# Patient Record
Sex: MALE | Race: WHITE | NOT HISPANIC OR LATINO | URBAN - METROPOLITAN AREA
[De-identification: names, ages, dates, MRNs, and addresses within clinical notes are randomized per-mention and may not be internally consistent; named-entity substitution may affect disease eponyms.]

---

## 2017-05-23 ENCOUNTER — HOSPITAL ENCOUNTER (EMERGENCY)
Facility: HOSPITAL | Age: 22
Discharge: LEFT BEFORE DISCHARGE | End: 2017-05-23

## 2017-05-23 VITALS
SYSTOLIC BLOOD PRESSURE: 152 MMHG | OXYGEN SATURATION: 99 % | BODY MASS INDEX: 32.33 KG/M2 | DIASTOLIC BLOOD PRESSURE: 95 MMHG | TEMPERATURE: 98.4 F | HEART RATE: 82 BPM | HEIGHT: 75 IN | RESPIRATION RATE: 14 BRPM | WEIGHT: 260 LBS

## 2017-05-23 PROCEDURE — 99211 OFF/OP EST MAY X REQ PHY/QHP: CPT

## 2020-10-26 ENCOUNTER — HOSPITAL ENCOUNTER (EMERGENCY)
Facility: HOSPITAL | Age: 25
Discharge: HOME | End: 2020-10-26
Attending: EMERGENCY MEDICINE | Admitting: EMERGENCY MEDICINE

## 2020-10-26 ENCOUNTER — APPOINTMENT (OUTPATIENT)
Dept: CT IMAGING | Facility: HOSPITAL | Age: 25
End: 2020-10-26

## 2020-10-26 VITALS
BODY MASS INDEX: 38.54 KG/M2 | HEART RATE: 70 BPM | SYSTOLIC BLOOD PRESSURE: 127 MMHG | RESPIRATION RATE: 18 BRPM | HEIGHT: 75 IN | WEIGHT: 310 LBS | DIASTOLIC BLOOD PRESSURE: 83 MMHG | TEMPERATURE: 98.1 F | OXYGEN SATURATION: 98 %

## 2020-10-26 DIAGNOSIS — M79.18 MYOFASCIAL PAIN: ICD-10-CM

## 2020-10-26 DIAGNOSIS — S39.012A BACK STRAIN, INITIAL ENCOUNTER: Primary | ICD-10-CM

## 2020-10-26 LAB
HOLD SPECIMEN: NORMAL
HOLD SPECIMEN: NORMAL
WHOLE BLOOD HOLD SPECIMEN: NORMAL
WHOLE BLOOD HOLD SPECIMEN: NORMAL

## 2020-10-26 PROCEDURE — 72128 CT CHEST SPINE W/O DYE: CPT

## 2020-10-26 PROCEDURE — 96374 THER/PROPH/DIAG INJ IV PUSH: CPT

## 2020-10-26 PROCEDURE — 72125 CT NECK SPINE W/O DYE: CPT

## 2020-10-26 PROCEDURE — 99284 EMERGENCY DEPT VISIT MOD MDM: CPT

## 2020-10-26 PROCEDURE — 25010000002 MORPHINE PER 10 MG: Performed by: EMERGENCY MEDICINE

## 2020-10-26 PROCEDURE — 72131 CT LUMBAR SPINE W/O DYE: CPT

## 2020-10-26 PROCEDURE — 25010000002 ONDANSETRON PER 1 MG: Performed by: EMERGENCY MEDICINE

## 2020-10-26 PROCEDURE — 96375 TX/PRO/DX INJ NEW DRUG ADDON: CPT

## 2020-10-26 RX ORDER — ONDANSETRON 2 MG/ML
4 INJECTION INTRAMUSCULAR; INTRAVENOUS ONCE
Status: COMPLETED | OUTPATIENT
Start: 2020-10-26 | End: 2020-10-26

## 2020-10-26 RX ORDER — HYDROCODONE BITARTRATE AND ACETAMINOPHEN 7.5; 325 MG/1; MG/1
1 TABLET ORAL EVERY 6 HOURS PRN
Qty: 15 TABLET | Refills: 0 | Status: SHIPPED | OUTPATIENT
Start: 2020-10-26 | End: 2021-09-13

## 2020-10-26 RX ORDER — SODIUM CHLORIDE 0.9 % (FLUSH) 0.9 %
10 SYRINGE (ML) INJECTION AS NEEDED
Status: DISCONTINUED | OUTPATIENT
Start: 2020-10-26 | End: 2020-10-26 | Stop reason: HOSPADM

## 2020-10-26 RX ORDER — MORPHINE SULFATE 2 MG/ML
4 INJECTION, SOLUTION INTRAMUSCULAR; INTRAVENOUS ONCE
Status: COMPLETED | OUTPATIENT
Start: 2020-10-26 | End: 2020-10-26

## 2020-10-26 RX ORDER — BACLOFEN 10 MG/1
10 TABLET ORAL 3 TIMES DAILY
Qty: 30 TABLET | Refills: 0 | Status: SHIPPED | OUTPATIENT
Start: 2020-10-26

## 2020-10-26 RX ADMIN — SODIUM CHLORIDE, PRESERVATIVE FREE 10 ML: 5 INJECTION INTRAVENOUS at 18:26

## 2020-10-26 RX ADMIN — ONDANSETRON 4 MG: 2 INJECTION INTRAMUSCULAR; INTRAVENOUS at 18:26

## 2020-10-26 RX ADMIN — MORPHINE SULFATE 4 MG: 2 INJECTION, SOLUTION INTRAMUSCULAR; INTRAVENOUS at 18:27

## 2020-10-26 NOTE — ED PROVIDER NOTES
EMERGENCY DEPARTMENT ENCOUNTER    Room Number:  28/28  Date of encounter:  10/26/2020  PCP: Provider, No Known  Historian: Patient      HPI:  Chief Complaint:   A complete HPI/ROS/PMH/PSH/SH/FH are unobtainable due to: Back pain    Context: Sana Matt is a 25 y.o. male who presents to the ED c/o severe upper back and shoulder pain for 4 days since he fell at work.  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  He said that initially the pain was moderate in severity, but since then it is worsened.  The pain radiates around to her shoulders and around the chest on both sides.  He has no weakness or numbness, no radicular symptoms in the lower extremities, no belly pain, and no cauda equina symptoms.  Patient did not hit his head, has no headache and no neck pain.  He did not hit his head.    Patient states that he was seen in the ED in [facility] twice, had a CT scan of the chest which he reports was negative, but he is not aware of anything else that they did.  He came here tonight because he just feels that something else is wrong.      PAST MEDICAL HISTORY  Active Ambulatory Problems     Diagnosis Date Noted   • No Active Ambulatory Problems     Resolved Ambulatory Problems     Diagnosis Date Noted   • No Resolved Ambulatory Problems     Past Medical History:   Diagnosis Date   • Injury of back          PAST SURGICAL HISTORY  Past Surgical History:   Procedure Laterality Date   • ADENOIDECTOMY     • APPENDECTOMY     • TONSILLECTOMY           FAMILY HISTORY  History reviewed. No pertinent family history.      SOCIAL HISTORY  Social History     Socioeconomic History   • Marital status:      Spouse name: Not on file   • Number of children: Not on file   • Years of education: Not on file   • Highest education level: Not on file   Tobacco Use   • Smoking status: Never Smoker   • Smokeless tobacco: Never Used   Substance and  Sexual Activity   • Alcohol use: Yes     Comment: prn   • Drug use: No         ALLERGIES  Patient has no known allergies.        REVIEW OF SYSTEMS  Review of Systems     All systems reviewed and negative except for those discussed in HPI.       PHYSICAL EXAM    I have reviewed the triage vital signs and nursing notes.    ED Triage Vitals   Temp Heart Rate Resp BP SpO2   10/26/20 1509 10/26/20 1509 10/26/20 1509 10/26/20 1610 10/26/20 1509   98.1 °F (36.7 °C) 103 16 (!) 160/101 99 %      Temp src Heart Rate Source Patient Position BP Location FiO2 (%)   10/26/20 1509 10/26/20 1509 -- -- --   Tympanic Monitor          Physical Exam  GENERAL: Awake and alert, appears uncomfortable, obese  HENT: nares patent, NCAT, neck nontender, full range of motion without pain  EYES: no scleral icterus, Guerrero, EOMI  CV: regular rhythm, regular rate  RESPIRATORY: normal effort  ABDOMEN: soft, nontender palpation, bowel sounds present  MUSCULOSKELETAL: no deformity, no extremity trauma, normal strength sensation.  There is moderate focal tenderness palpation in the mid lumbar spine without step-off or deformity.  Distal pulses are symmetric and intact  NEURO: alert, moves all extremities, follows commands, cranial nerves II through XII intact, no focal neuro deficits with normal reflexes in all extremities.  SKIN: warm, dry        LAB RESULTS  Recent Results (from the past 24 hour(s))   Light Blue Top    Collection Time: 10/26/20  6:29 PM   Result Value Ref Range    Extra Tube hold for add-on    Green Top (Gel)    Collection Time: 10/26/20  6:29 PM   Result Value Ref Range    Extra Tube Hold for add-ons.    Lavender Top    Collection Time: 10/26/20  6:29 PM   Result Value Ref Range    Extra Tube hold for add-on    Gold Top - SST    Collection Time: 10/26/20  6:29 PM   Result Value Ref Range    Extra Tube Hold for add-ons.        Ordered the above labs and independently reviewed the results.        RADIOLOGY  Ct Cervical Spine Without  Contrast    Result Date: 10/26/2020  CT CERVICAL SPINE WITHOUT CONTRAST, CT THORACIC SPINE WITHOUT CONTRAST, CT LUMBAR SPINE WITHOUT CONTRAST  HISTORY: 25-year-old fell down stairs with generalized back pain and pain between the shoulder blades.  TECHNIQUE: CT cervical, thoracic and lumbar spine without contrast. Data reconstructed in coronal and sagittal planes.Radiation dose reduction techniques were utilized, including automated exposure control and exposure modulation based on body size.  FINDINGS: Cervical spine vertebral body and disc space heights appear within normal limits. There is no evidence for fracture or malalignment. There is streak artifact limiting evaluation for canal narrowing particularly in the mid-to-lower cervical spine. There is no evidence for osseous encroachment on the central canal and neural foramina. Evaluation for canal stenosis could be performed with MRI if indicated.  THORACIC SPINE: There is Schmorl's node formation within the superior endplates of T11 and T12 associated with mild chronic diminished height. Minor endplate spurring is present within the thoracic spine. No fracture or acute abnormality is evident of the thoracic spine. There is no evidence for osseous encroachment of the central canal and neural foramina. There is a mild scoliotic curvature of the thoracic spine convex to the right at T6.  There is a calcified nodule in the left upper lobe. Residual thymic tissue is present in the anterior mediastinum. Left hilar calcified granulomas are present.  LUMBAR SPINE: Vertebral body and disc space heights appear within normal limits. There is no evidence for fracture or acute abnormality. Minimal disc bulging is present without central canal or foraminal narrowing. There is mild levoscoliotic curvature of the lumbar spine.      No evidence for fracture or malalignment or acute abnormality of the cervical, thoracic or lumbar spine. Mild degenerative changes. At T11 and T12  there is Schmorl's node formation along the superior endplates with slight chronic-appearing diminished height. Mild thoracolumbar scoliosis.  Discussed with Dr. Palmer in the emergency department on 10/26/2020 at 7:40 PM.  This report was finalized on 10/26/2020 8:16 PM by Dr. Rashid Faustin M.D.      Ct Thoracic Spine Without Contrast    Result Date: 10/26/2020  CT CERVICAL SPINE WITHOUT CONTRAST, CT THORACIC SPINE WITHOUT CONTRAST, CT LUMBAR SPINE WITHOUT CONTRAST  HISTORY: 25-year-old fell down stairs with generalized back pain and pain between the shoulder blades.  TECHNIQUE: CT cervical, thoracic and lumbar spine without contrast. Data reconstructed in coronal and sagittal planes.Radiation dose reduction techniques were utilized, including automated exposure control and exposure modulation based on body size.  FINDINGS: Cervical spine vertebral body and disc space heights appear within normal limits. There is no evidence for fracture or malalignment. There is streak artifact limiting evaluation for canal narrowing particularly in the mid-to-lower cervical spine. There is no evidence for osseous encroachment on the central canal and neural foramina. Evaluation for canal stenosis could be performed with MRI if indicated.  THORACIC SPINE: There is Schmorl's node formation within the superior endplates of T11 and T12 associated with mild chronic diminished height. Minor endplate spurring is present within the thoracic spine. No fracture or acute abnormality is evident of the thoracic spine. There is no evidence for osseous encroachment of the central canal and neural foramina. There is a mild scoliotic curvature of the thoracic spine convex to the right at T6.  There is a calcified nodule in the left upper lobe. Residual thymic tissue is present in the anterior mediastinum. Left hilar calcified granulomas are present.  LUMBAR SPINE: Vertebral body and disc space heights appear within normal limits. There is no  evidence for fracture or acute abnormality. Minimal disc bulging is present without central canal or foraminal narrowing. There is mild levoscoliotic curvature of the lumbar spine.      No evidence for fracture or malalignment or acute abnormality of the cervical, thoracic or lumbar spine. Mild degenerative changes. At T11 and T12 there is Schmorl's node formation along the superior endplates with slight chronic-appearing diminished height. Mild thoracolumbar scoliosis.  Discussed with Dr. Palmer in the emergency department on 10/26/2020 at 7:40 PM.  This report was finalized on 10/26/2020 8:16 PM by Dr. Rashid Faustin M.D.      Ct Lumbar Spine Without Contrast    Result Date: 10/26/2020  CT CERVICAL SPINE WITHOUT CONTRAST, CT THORACIC SPINE WITHOUT CONTRAST, CT LUMBAR SPINE WITHOUT CONTRAST  HISTORY: 25-year-old fell down stairs with generalized back pain and pain between the shoulder blades.  TECHNIQUE: CT cervical, thoracic and lumbar spine without contrast. Data reconstructed in coronal and sagittal planes.Radiation dose reduction techniques were utilized, including automated exposure control and exposure modulation based on body size.  FINDINGS: Cervical spine vertebral body and disc space heights appear within normal limits. There is no evidence for fracture or malalignment. There is streak artifact limiting evaluation for canal narrowing particularly in the mid-to-lower cervical spine. There is no evidence for osseous encroachment on the central canal and neural foramina. Evaluation for canal stenosis could be performed with MRI if indicated.  THORACIC SPINE: There is Schmorl's node formation within the superior endplates of T11 and T12 associated with mild chronic diminished height. Minor endplate spurring is present within the thoracic spine. No fracture or acute abnormality is evident of the thoracic spine. There is no evidence for osseous encroachment of the central canal and neural foramina. There is a  mild scoliotic curvature of the thoracic spine convex to the right at T6.  There is a calcified nodule in the left upper lobe. Residual thymic tissue is present in the anterior mediastinum. Left hilar calcified granulomas are present.  LUMBAR SPINE: Vertebral body and disc space heights appear within normal limits. There is no evidence for fracture or acute abnormality. Minimal disc bulging is present without central canal or foraminal narrowing. There is mild levoscoliotic curvature of the lumbar spine.      No evidence for fracture or malalignment or acute abnormality of the cervical, thoracic or lumbar spine. Mild degenerative changes. At T11 and T12 there is Schmorl's node formation along the superior endplates with slight chronic-appearing diminished height. Mild thoracolumbar scoliosis.  Discussed with Dr. Palmer in the emergency department on 10/26/2020 at 7:40 PM.  This report was finalized on 10/26/2020 8:16 PM by Dr. Rashid Faustin M.D.        I ordered the above noted radiological studies. Reviewed by me and discussed with radiologist.  See dictation for official radiology interpretation.      PROCEDURES    Procedures      MEDICATIONS GIVEN IN ER    Medications   sodium chloride 0.9 % flush 10 mL (10 mL Intravenous Given 10/26/20 1826)   morphine injection 4 mg (4 mg Intravenous Given 10/26/20 1827)   ondansetron (ZOFRAN) injection 4 mg (4 mg Intravenous Given 10/26/20 1826)         PROGRESS, DATA ANALYSIS, CONSULTS, AND MEDICAL DECISION MAKING    All labs have been independently reviewed by me.  All radiology studies have been reviewed by me and discussed with radiologist dictating the report.   EKG's independently viewed and interpreted by me.  Discussion below represents my analysis of pertinent findings related to patient's condition, differential diagnosis, treatment plan and final disposition.        ED Course as of Oct 26 2032   Mon Oct 26, 2020   2031 CT scan of the cervical, thoracic, and lumbar  spine shows no acute fracture or subluxation.  I believe this pain is all myofascial in nature, but he does appear uncomfortable I will give him a short course of stronger pain medication.    [DP]      ED Course User Index  [DP] Domo Palmer MD           PPE: Both the patient and I wore a surgical mask throughout the entire patient encounter. I wore protective goggles.     AS OF 20:32 EDT VITALS:    BP - 127/83  HR - 70  TEMP - 98.1 °F (36.7 °C) (Tympanic)  O2 SATS - 98%        DIAGNOSIS  Final diagnoses:   Back strain, initial encounter   Myofascial pain         DISPOSITION  Discharge           Domo Palmer MD  10/26/20 2032

## 2020-10-26 NOTE — ED NOTES
I am wearing mask, goggles, and gloves. When designated too; I am also wearing an apron and a face shield. The patient is wearing a surgical mask.      Dakota Acevedo RN  10/26/20 1478

## 2020-10-26 NOTE — ED NOTES
Patient was placed in face mask in first look.  Patient was wearing a face mask throughout our encounter.  I wore protective eye protection throughout the encounter.  Hand hygiene was performed before and after patient encounter.          Patient fell down 3 stairs on Friday and was seen twice at [facility] for same, comes to ER today with continued back and chest discomfort from mechanical fall 8 out of 10.     Aakash Giles RN  10/26/20 0246

## 2024-02-01 ENCOUNTER — OFFICE VISIT (OUTPATIENT)
Dept: FAMILY MEDICINE CLINIC | Facility: CLINIC | Age: 29
End: 2024-02-01
Payer: COMMERCIAL

## 2024-02-01 VITALS
HEART RATE: 100 BPM | BODY MASS INDEX: 39.17 KG/M2 | WEIGHT: 315 LBS | SYSTOLIC BLOOD PRESSURE: 142 MMHG | OXYGEN SATURATION: 99 % | HEIGHT: 75 IN | RESPIRATION RATE: 19 BRPM | DIASTOLIC BLOOD PRESSURE: 92 MMHG

## 2024-02-01 DIAGNOSIS — Z11.59 ENCOUNTER FOR HEPATITIS C SCREENING TEST FOR LOW RISK PATIENT: ICD-10-CM

## 2024-02-01 DIAGNOSIS — Z13.1 SCREENING FOR DIABETES MELLITUS: ICD-10-CM

## 2024-02-01 DIAGNOSIS — Z13.220 ENCOUNTER FOR LIPID SCREENING FOR CARDIOVASCULAR DISEASE: ICD-10-CM

## 2024-02-01 DIAGNOSIS — Z00.00 ROUTINE HEALTH MAINTENANCE: Primary | ICD-10-CM

## 2024-02-01 DIAGNOSIS — E34.9 HORMONE IMBALANCE: ICD-10-CM

## 2024-02-01 DIAGNOSIS — Z13.6 ENCOUNTER FOR LIPID SCREENING FOR CARDIOVASCULAR DISEASE: ICD-10-CM

## 2024-02-01 DIAGNOSIS — F64.0 GENDER DYSPHORIA IN ADULT: ICD-10-CM

## 2024-02-01 LAB
ALBUMIN SERPL-MCNC: 4.6 G/DL (ref 3.5–5.2)
ALBUMIN/GLOB SERPL: 1.6 G/DL
ALP SERPL-CCNC: 92 U/L (ref 39–117)
ALT SERPL-CCNC: 85 U/L (ref 1–41)
AST SERPL-CCNC: 58 U/L (ref 1–40)
BILIRUB SERPL-MCNC: 0.3 MG/DL (ref 0–1.2)
BUN SERPL-MCNC: 14 MG/DL (ref 6–20)
BUN/CREAT SERPL: 15.1 (ref 7–25)
CALCIUM SERPL-MCNC: 9.6 MG/DL (ref 8.6–10.5)
CHLORIDE SERPL-SCNC: 102 MMOL/L (ref 98–107)
CHOLEST SERPL-MCNC: 219 MG/DL (ref 0–200)
CO2 SERPL-SCNC: 24.2 MMOL/L (ref 22–29)
CREAT SERPL-MCNC: 0.93 MG/DL (ref 0.76–1.27)
EGFRCR SERPLBLD CKD-EPI 2021: 114.7 ML/MIN/1.73
GLOBULIN SER CALC-MCNC: 2.9 GM/DL
GLUCOSE SERPL-MCNC: 113 MG/DL (ref 65–99)
HDLC SERPL-MCNC: 34 MG/DL (ref 40–60)
LDLC SERPL CALC-MCNC: 148 MG/DL (ref 0–100)
POTASSIUM SERPL-SCNC: 4.5 MMOL/L (ref 3.5–5.2)
PROT SERPL-MCNC: 7.5 G/DL (ref 6–8.5)
SODIUM SERPL-SCNC: 136 MMOL/L (ref 136–145)
TRIGL SERPL-MCNC: 201 MG/DL (ref 0–150)
VLDLC SERPL CALC-MCNC: 37 MG/DL (ref 5–40)

## 2024-02-01 RX ORDER — ESTRADIOL VALERATE 20 MG/ML
INJECTION INTRAMUSCULAR
Qty: 5 ML | Refills: 3 | Status: SHIPPED | OUTPATIENT
Start: 2024-02-01

## 2024-02-01 NOTE — PROGRESS NOTES
"Chief Complaint  Establish Care, Gender Dysphoria in Adult, and Annual Exam    Subjective    History of Present Illness    Sana aMtt presents to [facility] for Establish Care, Gender Dysphoria in Adult, and Annual Exam.  History of Present Illness     Here today as above. Has not seen a physician in a while now, is overdue for general health exam.    [place] different from an early age, recalls looking in the mirror and feeling \"very off\" from the age of 9 or 10. Was able to more fully explore gender identity during her teens as she became aware of others who felt the same. Didn't come out socially until a year and a half ago. Got  to a cis-woman early and was afraid to compromise that. Wife has been extremely accepting since the social transition and is excited to have Sana start hormones. Is now much more comfortable in her identity as a transwoman and is excited to start hormones to see her body change.     Has done quite a bit of research into what's involved in gender-affirming hormone therapy. Doesn't have any real concerns about transition meds. No worries about her ability to self-administer.     Due for some routine labs today. Believes that she is caught up on vaccinations overall. Will get records for our review.    Working on diet and exercise. Weight is generally stable.    Has good family and social support. 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    Objective     Vital Signs:   /92   Pulse 100   Resp 19   Ht 190.5 cm (75\")   Wt (!) 155 kg (342 lb)   SpO2 99%   BMI 42.75 kg/m²   Physical Exam  Vitals and nursing note reviewed.   Constitutional:       General: She is not in acute distress.     Appearance: Normal appearance. She is not ill-appearing.   Cardiovascular:      Rate and Rhythm: Normal rate and regular rhythm.      Pulses: Normal pulses.      Heart sounds: Normal heart sounds. No murmur heard.  Pulmonary:      Effort: Pulmonary effort is " normal. No respiratory distress.      Breath sounds: Normal breath sounds. No rales.   Neurological:      Mental Status: She is alert and oriented to person, place, and time. Mental status is at baseline.   Psychiatric:         Mood and Affect: Mood normal.         Behavior: Behavior normal.           Assessment and Plan   Diagnoses and all orders for this visit:    1. Routine health maintenance (Primary)    2. Gender dysphoria in adult  -     estradiol valerate (DELESTROGEN) 20 MG/ML injection; Inject 0.15 ml (3 mg) subcutaneously twice weekly.  Dispense: 5 mL; Refill: 3    3. Hormone imbalance  -     estradiol valerate (DELESTROGEN) 20 MG/ML injection; Inject 0.15 ml (3 mg) subcutaneously twice weekly.  Dispense: 5 mL; Refill: 3    4. Screening for diabetes mellitus  -     Comprehensive Metabolic Panel    5. Encounter for lipid screening for cardiovascular disease  -     Lipid Panel    6. Encounter for hepatitis C screening test for low risk patient  -     Hepatitis C Antibody    Established gender dysphoria, hormonal imbalance in a transgender patient with a strong desire for gender affirming hormone therapy.     Gender identity is consistent, persistent, and insistent. Discussed risks, benefits, alternatives, potential side effects of therapy, and typical follow up. Resources provided including [facility] Standards of Care, PFLAG “Our Trans Loved Ones”, and local support groups.     Meds as above. Gave detailed instructions as to proper subcutaneous injection technique and demonstrated the same. Given sample injection supplies to last until they are able to obtain their own supply. Encouraged to sign up for and use [facility].    Class 3 Severe Obesity (BMI >=40). Obesity-related health conditions include the following: none. Obesity is unchanged. BMI is is above average; BMI management plan is completed. We discussed portion control and increasing exercise.    Encouraged to continue working on healthy lifestyle habits.  Recommended follow up as below. Encouraged communication via MyChart in the meantime.     Patient was given instructions and counseling regarding her condition or for health maintenance advice. Please see specific information pulled into the AVS (placed there by myself) if appropriate.    Return in about 3 months (around 5/1/2024), or if symptoms worsen or fail to improve, for f/u gender-affirming hormone therapy.    TIM Bruce MD    Prevention counseling performed as below: Mindfulness for stress management.

## 2024-02-01 NOTE — PATIENT INSTRUCTIONS
"Transfemme Resources    [facility] Transgender Support Group - Facebook group and Discord     [employer] for Transgender Health, Standards of Care  https://www.wpath.org/publications/soc - pages 38 and 40 especially     [facility], Transgender Health - [email or web address]      [facility] - [email or web address]    [facility] - [email or web address]     Parents, Families, and Friends of [place] and [place] - [email or web address]  - good document for friends/family who need to learn the basics     [facility] [place] - https://[email or web address]/documents  - helps with name change, gender marker change, etc, is state specific     * * * * * *    Insurance will typically cover hormones, but if they don't, or if you need to pay out of pocket, use the following site.    [employer]     Can get injection supplies online - [facility] supply [employer]. Recommended supplies as below:     1 ml, Luer-Marry Syringes      18G 1.5” or 20G 1\" hypodermic needles for drawing up      27G 1/2\" hypodermic needles for injecting      [facility] Trans Health Injection Guide -   [email or web address]-content/uploads/[email or web address]_InjectionGuide.[email or web address]     Injection supplies:  Syringes   Needles   Alcohol swabs  Cotton balls/band-aids  Sharps container     Mindfulness apps: Headspace, Smiling Mind, Calvary!    Mindfulness-Based Stress Reduction  Mindfulness-based stress reduction (MBSR) is a program that helps people learn to practice mindfulness. Mindfulness is the practice of intentionally paying attention to the present moment. It can be learned and practiced through techniques such as education, breathing exercises, meditation, and yoga. MBSR includes several mindfulness techniques in one program.  MBSR works best when you understand the " treatment, are willing to try new things, and can commit to spending time practicing what you learn. MBSR training may include learning about:  How your emotions, thoughts, and reactions affect your body.  New ways to respond to things that cause negative thoughts to start (triggers).  How to notice your thoughts and let go of them.  Practicing awareness of everyday things that you normally do without thinking.  The techniques and goals of different types of meditation.  What are the benefits of MBSR?  MBSR can have many benefits, which include helping you to:  Develop self-awareness. This refers to knowing and understanding yourself.  Learn skills and attitudes that help you to participate in your own health care.  Learn new ways to care for yourself.  Be more accepting about how things are, and let things go.  Be less judgmental and approach things with an open mind.  Be patient with yourself and trust yourself more.  MBSR has also been shown to:  Reduce negative emotions, such as depression and anxiety.  Improve memory and focus.  Change how you sense and approach pain.  Boost your body's ability to fight infections.  Help you connect better with other people.  Improve your sense of well-being.  Follow these instructions at home:    Find a local in-person or online MBSR program.  Set aside some time regularly for mindfulness practice.  Find a mindfulness practice that works best for you. This may include one or more of the following:  Meditation. Meditation involves focusing your mind on a certain thought or activity.  Breathing awareness exercises. These help you to stay present by focusing on your breath.  Body scan. For this practice, you lie down and pay attention to each part of your body from head to toe. You can identify tension and soreness and intentionally relax parts of your body.  Yoga. Yoga involves stretching and breathing, and it can improve your ability to move and be flexible. It can also provide  an experience of testing your body's limits, which can help you release stress.  Mindful eating. This way of eating involves focusing on the taste, texture, color, and smell of each bite of food. Because this slows down eating and helps you feel full sooner, it can be an important part of a weight-loss plan.  Find a podcast or recording that provides guidance for breathing awareness, body scan, or meditation exercises. You can listen to these any time when you have a free moment to rest without distractions.  Follow your treatment plan as told by your health care provider. This may include taking regular medicines and making changes to your diet or lifestyle as recommended.  How to practice mindfulness  To do a basic awareness exercise:  Find a comfortable place to sit.  Pay attention to the present moment. Observe your thoughts, feelings, and surroundings just as they are.  Avoid placing judgment on yourself, your feelings, or your surroundings. Make note of any judgment that comes up, and let it go.  Your mind may wander, and that is okay. Make note of when your thoughts drift, and return your attention to the present moment.  To do basic mindfulness meditation:  Find a comfortable place to sit. This may include a stable chair or a firm floor cushion.  Sit upright with your back straight. Let your arms fall next to your side with your hands resting on your legs.  If sitting in a chair, rest your feet flat on the floor.  If sitting on a cushion, cross your legs in front of you.  Keep your head in a neutral position with your chin dropped slightly. Relax your jaw and rest the tip of your tongue on the roof of your mouth. Drop your gaze to the floor. You can close your eyes if you like.  Breathe normally and pay attention to your breath. Feel the air moving in and out of your nose. Feel your belly expanding and relaxing with each breath.  Your mind may wander, and that is okay. Make note of when your thoughts drift,  and return your attention to your breath.  Avoid placing judgment on yourself, your feelings, or your surroundings. Make note of any judgment or feelings that come up, let them go, and bring your attention back to your breath.  When you are ready, lift your gaze or open your eyes. Pay attention to how your body feels after the meditation.  Where to find more information  You can find more information about MBSR from:  Your health care provider.  Community-based meditation centers or programs.  Programs offered near you.  Summary  Mindfulness-based stress reduction (MBSR) is a program that teaches you how to intentionally pay attention to the present moment. It is used with other treatments to help you cope better with daily stress, emotions, and pain.  MBSR focuses on developing self-awareness, which allows you to respond to life stress without judgment or negative emotions.  MBSR programs may involve learning different mindfulness practices, such as breathing exercises, meditation, yoga, body scan, or mindful eating. Find a mindfulness practice that works best for you, and set aside time for it on a regular basis.  This information is not intended to replace advice given to you by your health care provider. Make sure you discuss any questions you have with your health care provider.  Document Released: 04/26/2018 Document Revised: 11/30/2018 Document Reviewed: 04/26/2018  Elsevier Patient Education © 2020 [employer]vier Inc.

## 2024-02-02 LAB — HCV IGG SERPL QL IA: NON REACTIVE

## 2024-05-06 ENCOUNTER — OFFICE VISIT (OUTPATIENT)
Dept: FAMILY MEDICINE CLINIC | Facility: CLINIC | Age: 29
End: 2024-05-06
Payer: COMMERCIAL

## 2024-05-06 VITALS
OXYGEN SATURATION: 99 % | RESPIRATION RATE: 17 BRPM | BODY MASS INDEX: 39.17 KG/M2 | DIASTOLIC BLOOD PRESSURE: 80 MMHG | WEIGHT: 315 LBS | HEART RATE: 89 BPM | HEIGHT: 75 IN | SYSTOLIC BLOOD PRESSURE: 116 MMHG

## 2024-05-06 DIAGNOSIS — E34.9 HORMONE IMBALANCE: ICD-10-CM

## 2024-05-06 DIAGNOSIS — Z51.81 THERAPEUTIC DRUG MONITORING: ICD-10-CM

## 2024-05-06 DIAGNOSIS — F33.1 MAJOR DEPRESSIVE DISORDER, RECURRENT EPISODE, MODERATE DEGREE: ICD-10-CM

## 2024-05-06 DIAGNOSIS — F64.0 GENDER DYSPHORIA IN ADULT: Primary | ICD-10-CM

## 2024-05-06 DIAGNOSIS — F41.1 GENERALIZED ANXIETY DISORDER: ICD-10-CM

## 2024-05-06 DIAGNOSIS — L73.9 FOLLICULITIS: ICD-10-CM

## 2024-05-06 LAB
ALBUMIN SERPL-MCNC: 4.4 G/DL (ref 3.5–5.2)
ALBUMIN/GLOB SERPL: 1.9 G/DL
ALP SERPL-CCNC: 99 U/L (ref 39–117)
ALT SERPL-CCNC: 64 U/L (ref 1–41)
AST SERPL-CCNC: 40 U/L (ref 1–40)
BILIRUB SERPL-MCNC: 0.2 MG/DL (ref 0–1.2)
BUN SERPL-MCNC: 12 MG/DL (ref 6–20)
BUN/CREAT SERPL: 15.8 (ref 7–25)
CALCIUM SERPL-MCNC: 8.8 MG/DL (ref 8.6–10.5)
CHLORIDE SERPL-SCNC: 107 MMOL/L (ref 98–107)
CHOLEST SERPL-MCNC: 188 MG/DL (ref 0–200)
CO2 SERPL-SCNC: 22 MMOL/L (ref 22–29)
CREAT SERPL-MCNC: 0.76 MG/DL (ref 0.76–1.27)
EGFRCR SERPLBLD CKD-EPI 2021: 125.6 ML/MIN/1.73
GLOBULIN SER CALC-MCNC: 2.3 GM/DL
GLUCOSE SERPL-MCNC: 108 MG/DL (ref 65–99)
HDLC SERPL-MCNC: 42 MG/DL (ref 40–60)
LDLC SERPL CALC-MCNC: 124 MG/DL (ref 0–100)
POTASSIUM SERPL-SCNC: 4.5 MMOL/L (ref 3.5–5.2)
PROT SERPL-MCNC: 6.7 G/DL (ref 6–8.5)
SODIUM SERPL-SCNC: 137 MMOL/L (ref 136–145)
TRIGL SERPL-MCNC: 119 MG/DL (ref 0–150)
VLDLC SERPL CALC-MCNC: 22 MG/DL (ref 5–40)

## 2024-05-06 PROCEDURE — 99214 OFFICE O/P EST MOD 30 MIN: CPT | Performed by: FAMILY MEDICINE

## 2024-05-06 RX ORDER — ALBUTEROL SULFATE 90 UG/1
2 AEROSOL, METERED RESPIRATORY (INHALATION) EVERY 4 HOURS PRN
COMMUNITY

## 2024-05-06 RX ORDER — ESCITALOPRAM OXALATE 10 MG/1
10 TABLET ORAL DAILY
Qty: 90 TABLET | Refills: 1 | Status: SHIPPED | OUTPATIENT
Start: 2024-05-06

## 2024-05-06 RX ORDER — CLINDAMYCIN PHOSPHATE 10 MG/G
1 GEL TOPICAL 2 TIMES DAILY
Qty: 30 G | Refills: 1 | Status: SHIPPED | OUTPATIENT
Start: 2024-05-06

## 2024-05-07 LAB
ESTRADIOL SERPL-MCNC: 129 PG/ML (ref 7.6–42.6)
ESTRONE SERPL-MCNC: 89 PG/ML (ref 0–174)
PROGEST SERPL-MCNC: <0.1 NG/ML (ref 0–0.5)
TESTOST SERPL-MCNC: 21 NG/DL (ref 264–916)

## 2024-06-17 DIAGNOSIS — E34.9 HORMONE IMBALANCE: ICD-10-CM

## 2024-06-17 DIAGNOSIS — F64.0 GENDER DYSPHORIA IN ADULT: ICD-10-CM

## 2024-06-18 RX ORDER — ESTRADIOL VALERATE 20 MG/ML
INJECTION INTRAMUSCULAR
Qty: 5 ML | Refills: 3 | Status: SHIPPED | OUTPATIENT
Start: 2024-06-18

## 2024-08-26 ENCOUNTER — OFFICE VISIT (OUTPATIENT)
Dept: FAMILY MEDICINE CLINIC | Facility: CLINIC | Age: 29
End: 2024-08-26
Payer: COMMERCIAL

## 2024-08-26 VITALS
HEART RATE: 86 BPM | WEIGHT: 315 LBS | RESPIRATION RATE: 16 BRPM | SYSTOLIC BLOOD PRESSURE: 144 MMHG | DIASTOLIC BLOOD PRESSURE: 96 MMHG | OXYGEN SATURATION: 98 % | HEIGHT: 75 IN | BODY MASS INDEX: 39.17 KG/M2

## 2024-08-26 DIAGNOSIS — F41.1 GENERALIZED ANXIETY DISORDER: ICD-10-CM

## 2024-08-26 DIAGNOSIS — F64.0 GENDER DYSPHORIA IN ADULT: Primary | ICD-10-CM

## 2024-08-26 DIAGNOSIS — E34.9 HORMONE IMBALANCE: ICD-10-CM

## 2024-08-26 DIAGNOSIS — Z51.81 THERAPEUTIC DRUG MONITORING: ICD-10-CM

## 2024-08-26 DIAGNOSIS — F33.1 MAJOR DEPRESSIVE DISORDER, RECURRENT EPISODE, MODERATE DEGREE: ICD-10-CM

## 2024-08-26 PROCEDURE — 99214 OFFICE O/P EST MOD 30 MIN: CPT | Performed by: FAMILY MEDICINE

## 2024-08-26 RX ORDER — ESCITALOPRAM OXALATE 20 MG/1
20 TABLET ORAL DAILY
Qty: 90 TABLET | Refills: 3 | Status: SHIPPED | OUTPATIENT
Start: 2024-08-26

## 2024-08-26 NOTE — PROGRESS NOTES
"Chief Complaint  Gender dysphoria in adult (Interested in progesterone), Depression (Interested in increase in lexapro), and Anxiety    Subjective    History of Present Illness    Sana Matt presents to [facility] for Gender dysphoria in adult (Interested in progesterone), Depression (Interested in increase in lexapro), and Anxiety.  History of Present Illness     Here today for follow-up as above. Continues on estradiol injections as prescribed. Reports good adherence and tolerance, no unwanted side effects. No problems with self administration, no injection site reactions. Very happy with the current state of her transition. Due for check of hormone levels.    Stress has been quite high as she is currently undergoing a divorce. Seems to be managing it fairly well though is thinking that an increased dose of escitalopram would be useful. Had titrated from 5 mg up to 10 mg. Feels that this is an improvement but not quite controlling symptoms as much as needed.    Objective     Vital Signs:   /96   Pulse 86   Resp 16   Ht 190.5 cm (75\")   Wt (!) 158 kg (348 lb 14.4 oz)   SpO2 98%   BMI 43.61 kg/m²   Physical Exam  Vitals and nursing note reviewed.   Constitutional:       General: She is not in acute distress.     Appearance: Normal appearance. She is not ill-appearing.   Cardiovascular:      Rate and Rhythm: Normal rate and regular rhythm.      Pulses: Normal pulses.      Heart sounds: Normal heart sounds. No murmur heard.  Pulmonary:      Effort: Pulmonary effort is normal. No respiratory distress.      Breath sounds: Normal breath sounds. No rales.   Neurological:      Mental Status: She is alert and oriented to person, place, and time. Mental status is at baseline.   Psychiatric:         Mood and Affect: Mood normal.         Behavior: Behavior normal.                 Assessment and Plan   Diagnoses and all orders for this visit:    1. Gender dysphoria in adult " (Primary)  -     Estradiol  -     Estrone  -     Testosterone  -     Comprehensive Metabolic Panel  -     Progesterone  -     Lipid Panel    2. Hormone imbalance  -     Estradiol  -     Estrone  -     Testosterone  -     Comprehensive Metabolic Panel  -     Progesterone  -     Lipid Panel    3. Therapeutic drug monitoring  -     Estradiol  -     Estrone  -     Testosterone  -     Comprehensive Metabolic Panel  -     Progesterone  -     Lipid Panel    4. Generalized anxiety disorder  -     escitalopram (LEXAPRO) 20 MG tablet; Take 1 tablet by mouth Daily.  Dispense: 90 tablet; Refill: 3    5. Major depressive disorder, recurrent episode, moderate degree  -     escitalopram (LEXAPRO) 20 MG tablet; Take 1 tablet by mouth Daily.  Dispense: 90 tablet; Refill: 3    Orders as above. I will contact her with results and we can discuss any adjustments to her regimen as needed.    Will increase escitalopram to 20 mg. She will let me know how this works for her and we can add additional agents as needed. Encouraged her to see a therapist.    Recommended follow up as below. Encouraged communication via [employer] in the meantime.     Patient was given instructions and counseling regarding her condition or for health maintenance advice. Please see specific information pulled into the AVS (placed there by myself) if appropriate.    Return in about 3 months (around 11/26/2024), or if symptoms worsen or fail to improve, for f/u gender-affirming hormone therapy.    TIM Bruce MD

## 2024-08-27 LAB
ALBUMIN SERPL-MCNC: 4.2 G/DL (ref 4.3–5.2)
ALP SERPL-CCNC: 94 IU/L (ref 44–121)
ALT SERPL-CCNC: 58 IU/L (ref 0–44)
AST SERPL-CCNC: 29 IU/L (ref 0–40)
BILIRUB SERPL-MCNC: 0.4 MG/DL (ref 0–1.2)
BUN SERPL-MCNC: 13 MG/DL (ref 6–20)
BUN/CREAT SERPL: 19 (ref 9–20)
CALCIUM SERPL-MCNC: 9.1 MG/DL (ref 8.7–10.2)
CHLORIDE SERPL-SCNC: 100 MMOL/L (ref 96–106)
CHOLEST SERPL-MCNC: 201 MG/DL (ref 100–199)
CO2 SERPL-SCNC: 21 MMOL/L (ref 20–29)
CREAT SERPL-MCNC: 0.68 MG/DL (ref 0.76–1.27)
EGFRCR SERPLBLD CKD-EPI 2021: 130 ML/MIN/1.73
ESTRADIOL SERPL-MCNC: 229 PG/ML (ref 7.6–42.6)
GLOBULIN SER CALC-MCNC: 2.9 G/DL (ref 1.5–4.5)
GLUCOSE SERPL-MCNC: 96 MG/DL (ref 70–99)
HDLC SERPL-MCNC: 43 MG/DL
LDLC SERPL CALC-MCNC: 133 MG/DL (ref 0–99)
POTASSIUM SERPL-SCNC: 4.1 MMOL/L (ref 3.5–5.2)
PROGEST SERPL-MCNC: 0.1 NG/ML (ref 0–0.5)
PROT SERPL-MCNC: 7.1 G/DL (ref 6–8.5)
SODIUM SERPL-SCNC: 136 MMOL/L (ref 134–144)
TESTOST SERPL-MCNC: 7 NG/DL (ref 264–916)
TRIGL SERPL-MCNC: 140 MG/DL (ref 0–149)
VLDLC SERPL CALC-MCNC: 25 MG/DL (ref 5–40)

## 2024-09-03 LAB — ESTRONE SERPL-MCNC: 151 PG/ML (ref 0–174)

## 2025-02-06 ENCOUNTER — OFFICE VISIT (OUTPATIENT)
Dept: FAMILY MEDICINE CLINIC | Facility: CLINIC | Age: 30
End: 2025-02-06
Payer: COMMERCIAL

## 2025-02-06 VITALS
RESPIRATION RATE: 16 BRPM | OXYGEN SATURATION: 98 % | DIASTOLIC BLOOD PRESSURE: 90 MMHG | WEIGHT: 315 LBS | HEART RATE: 84 BPM | BODY MASS INDEX: 39.17 KG/M2 | SYSTOLIC BLOOD PRESSURE: 140 MMHG | HEIGHT: 75 IN

## 2025-02-06 DIAGNOSIS — F64.0 GENDER DYSPHORIA IN ADULT: Primary | ICD-10-CM

## 2025-02-06 DIAGNOSIS — F41.1 GENERALIZED ANXIETY DISORDER: ICD-10-CM

## 2025-02-06 DIAGNOSIS — F33.1 MAJOR DEPRESSIVE DISORDER, RECURRENT EPISODE, MODERATE DEGREE: ICD-10-CM

## 2025-02-06 DIAGNOSIS — E66.01 CLASS 3 SEVERE OBESITY DUE TO EXCESS CALORIES WITHOUT SERIOUS COMORBIDITY WITH BODY MASS INDEX (BMI) OF 45.0 TO 49.9 IN ADULT: ICD-10-CM

## 2025-02-06 DIAGNOSIS — E34.9 HORMONE IMBALANCE: ICD-10-CM

## 2025-02-06 DIAGNOSIS — Z51.81 THERAPEUTIC DRUG MONITORING: ICD-10-CM

## 2025-02-06 DIAGNOSIS — L70.0 ACNE VULGARIS: ICD-10-CM

## 2025-02-06 DIAGNOSIS — E66.813 CLASS 3 SEVERE OBESITY DUE TO EXCESS CALORIES WITHOUT SERIOUS COMORBIDITY WITH BODY MASS INDEX (BMI) OF 45.0 TO 49.9 IN ADULT: ICD-10-CM

## 2025-02-06 PROCEDURE — 99214 OFFICE O/P EST MOD 30 MIN: CPT | Performed by: FAMILY MEDICINE

## 2025-02-06 RX ORDER — PROGESTERONE 50 MG/ML
10 INJECTION, SOLUTION INTRAMUSCULAR WEEKLY
Qty: 10 ML | Refills: 1 | Status: SHIPPED | OUTPATIENT
Start: 2025-02-06

## 2025-02-06 RX ORDER — ESTRADIOL VALERATE 20 MG/ML
INJECTION INTRAMUSCULAR
Qty: 15 ML | Refills: 3 | Status: SHIPPED | OUTPATIENT
Start: 2025-02-06

## 2025-02-06 NOTE — PROGRESS NOTES
"Chief Complaint  Gender dysphoria in adult    Subjective    History of Present Illness  History of Present Illness  The patient presents for evaluation of hormone replacement therapy, acne, and weight gain.    She has been on estradiol injections for a year now and continues to receive them twice weekly on Tuesdays and Fridays. She reports overall satisfaction with her current treatment regimen, including the recent adjustment in her Lexapro dosage, which she believes has been beneficial. She notes a cessation in breast growth. She is seeking a refill of her estradiol prescription as she only has one dose remaining. She reports no problems with self administration, no injection site reactions.    She continues on escitalopram for management of anxiety and depression. Reports good adherence and tolerance. Symptoms seem well-controlled. Mental health is currently stable despite increased stressors.    She has observed an increase in acne localized to the lower region of her face, which she attributes to either anxiety or her medication. Despite maintaining a thorough skincare routine and utilizing various creams and ointments, the acne persists. She has not experienced facial acne since her teenage years. She also reports that her skin has become significantly softer since starting hormone therapy, with most of her previous skin issues resolving within four months of initiation. She describes her skin as extremely dry, necessitating the use of multiple moisturizers. She has been using over-the-counter benzoyl peroxide for approximately two weeks but is uncertain of its efficacy.    She has experienced a slight weight gain, which she attributes to stress related to her divorce. However, she has recently eliminated soda from her diet and reports feeling better overall.      Objective     Vital Signs:   /90   Pulse 84   Resp 16   Ht 190.5 cm (75\")   Wt (!) 164 kg (361 lb 3.2 oz)   SpO2 98%   BMI 45.15 kg/m² "   Physical Exam  Vitals and nursing note reviewed.   Constitutional:       General: She is not in acute distress.     Appearance: Normal appearance. She is not ill-appearing.   Cardiovascular:      Rate and Rhythm: Normal rate and regular rhythm.      Pulses: Normal pulses.      Heart sounds: Normal heart sounds. No murmur heard.  Pulmonary:      Effort: Pulmonary effort is normal. No respiratory distress.      Breath sounds: Normal breath sounds. No rales.   Skin:     Comments: Mild acne vulgaris on the chin.   Neurological:      Mental Status: She is alert and oriented to person, place, and time. Mental status is at baseline.   Psychiatric:         Mood and Affect: Mood normal.         Behavior: Behavior normal.           Assessment and Plan   Diagnoses and all orders for this visit:    1. Gender dysphoria in adult (Primary)  -     Estradiol  -     Estrone  -     Testosterone  -     Comprehensive Metabolic Panel  -     Progesterone  -     Lipid Panel  -     estradiol valerate (DELESTROGEN) 20 MG/ML injection; Inject 0.15 ml (3 mg) subcutaneously twice weekly. Please discard vial 28 days after opening/puncturing.  Dispense: 15 mL; Refill: 3  -     progesterone oil 50 MG/ML injection; Inject 0.2 mL into the appropriate muscle as directed by prescriber 1 (One) Time Per Week.  Dispense: 10 mL; Refill: 1    2. Hormone imbalance  -     Estradiol  -     Estrone  -     Testosterone  -     Comprehensive Metabolic Panel  -     Progesterone  -     Lipid Panel  -     estradiol valerate (DELESTROGEN) 20 MG/ML injection; Inject 0.15 ml (3 mg) subcutaneously twice weekly. Please discard vial 28 days after opening/puncturing.  Dispense: 15 mL; Refill: 3  -     progesterone oil 50 MG/ML injection; Inject 0.2 mL into the appropriate muscle as directed by prescriber 1 (One) Time Per Week.  Dispense: 10 mL; Refill: 1    3. Therapeutic drug monitoring  -     Estradiol  -     Estrone  -     Testosterone  -     Comprehensive Metabolic  Panel  -     Progesterone  -     Lipid Panel    4. Generalized anxiety disorder    5. Major depressive disorder, recurrent episode, moderate degree    6. Acne vulgaris    7. Class 3 severe obesity due to excess calories without serious comorbidity with body mass index (BMI) of 45.0 to 49.9 in adult      Assessment & Plan  1. Hormone replacement therapy.  She reports that her breast growth has subsided. Progesterone therapy was discussed as a potential addition to her treatment regimen. She can choose between oral, rectal, or injectable forms. Injectable progesterone can be combined with her estradiol injections to avoid increasing the number of injections. A prescription for estradiol, consisting of three vials, will be provided. She is advised to discard each vial 28 days post-opening. A prescription for progesterone will also be issued. Her hormone levels will be assessed during this visit.    2. Anxiety and depression.  She is encouraged to continue escitalopram as prescribed. She will let me know as refills are needed.    3. Acne.  She reports increased acne on the lower portion of her face, possibly due to anxiety or medication. Over-the-counter benzoyl peroxide has been used for about 2 weeks without significant improvement. Topical clindamycin and benzoyl peroxide were recommended. She can purchase these over the counter or get a prescription if needed.    4. Weight gain.  She has experienced a slight weight gain, which she attributes to stress related to her divorce. However, she has recently eliminated soda from her diet and reports feeling better overall.    Recommended follow up as below. Encouraged communication via [employer] in the meantime.     Patient was given instructions and counseling regarding her condition or for health maintenance advice. Please see specific information pulled into the AVS (placed there by myself) if appropriate.    No follow-ups on file.    Patient or patient representative  verbalized consent for the use of Ambient Listening during the visit with  Aakash Bruce MD for chart documentation. 2/7/2025  15:23 KAMRAN Bruce MD

## 2025-02-07 PROBLEM — E66.813 CLASS 3 SEVERE OBESITY DUE TO EXCESS CALORIES WITHOUT SERIOUS COMORBIDITY WITH BODY MASS INDEX (BMI) OF 45.0 TO 49.9 IN ADULT: Status: ACTIVE | Noted: 2025-02-07

## 2025-02-07 PROBLEM — E66.01 CLASS 3 SEVERE OBESITY DUE TO EXCESS CALORIES WITHOUT SERIOUS COMORBIDITY WITH BODY MASS INDEX (BMI) OF 45.0 TO 49.9 IN ADULT: Status: ACTIVE | Noted: 2025-02-07

## 2025-03-10 DIAGNOSIS — E66.01 CLASS 3 SEVERE OBESITY DUE TO EXCESS CALORIES WITHOUT SERIOUS COMORBIDITY WITH BODY MASS INDEX (BMI) OF 45.0 TO 49.9 IN ADULT: Primary | ICD-10-CM

## 2025-03-10 DIAGNOSIS — E66.813 CLASS 3 SEVERE OBESITY DUE TO EXCESS CALORIES WITHOUT SERIOUS COMORBIDITY WITH BODY MASS INDEX (BMI) OF 45.0 TO 49.9 IN ADULT: Primary | ICD-10-CM

## 2025-03-14 LAB
ALBUMIN SERPL-MCNC: 3.8 G/DL (ref 3.5–5.2)
ALBUMIN/GLOB SERPL: 1.3 G/DL
ALP SERPL-CCNC: 111 U/L (ref 39–117)
ALT SERPL-CCNC: 75 U/L (ref 1–41)
AST SERPL-CCNC: 46 U/L (ref 1–40)
BILIRUB SERPL-MCNC: <0.2 MG/DL (ref 0–1.2)
BUN SERPL-MCNC: 11 MG/DL (ref 6–20)
BUN/CREAT SERPL: 16.2 (ref 7–25)
CALCIUM SERPL-MCNC: 8.8 MG/DL (ref 8.6–10.5)
CHLORIDE SERPL-SCNC: 104 MMOL/L (ref 98–107)
CHOLEST SERPL-MCNC: 203 MG/DL (ref 0–200)
CO2 SERPL-SCNC: 21.1 MMOL/L (ref 22–29)
CREAT SERPL-MCNC: 0.68 MG/DL (ref 0.76–1.27)
EGFRCR SERPLBLD CKD-EPI 2021: 129 ML/MIN/1.73
ESTRADIOL SERPL-MCNC: 251 PG/ML (ref 7.6–42.6)
ESTRONE SERPL-MCNC: 120 PG/ML (ref 0–174)
GLOBULIN SER CALC-MCNC: 3 GM/DL
GLUCOSE SERPL-MCNC: 119 MG/DL (ref 65–99)
HBA1C MFR BLD: 5.4 % (ref 4.8–5.6)
HDLC SERPL-MCNC: 43 MG/DL (ref 40–60)
LDLC SERPL CALC-MCNC: 136 MG/DL (ref 0–100)
POTASSIUM SERPL-SCNC: 4 MMOL/L (ref 3.5–5.2)
PROGEST SERPL-MCNC: 0.1 NG/ML (ref 0–0.5)
PROT SERPL-MCNC: 6.8 G/DL (ref 6–8.5)
SODIUM SERPL-SCNC: 138 MMOL/L (ref 136–145)
TESTOST SERPL-MCNC: 10 NG/DL (ref 264–916)
TRIGL SERPL-MCNC: 132 MG/DL (ref 0–150)
VLDLC SERPL CALC-MCNC: 24 MG/DL (ref 5–40)

## 2025-08-26 DIAGNOSIS — F64.0 GENDER DYSPHORIA IN ADULT: ICD-10-CM

## 2025-08-26 DIAGNOSIS — E34.9 HORMONE IMBALANCE: ICD-10-CM

## 2025-08-26 RX ORDER — PROGESTERONE 50 MG/ML
INJECTION, SOLUTION INTRAMUSCULAR
Qty: 10 ML | Refills: 0 | Status: SHIPPED | OUTPATIENT
Start: 2025-08-26